# Patient Record
Sex: FEMALE | Race: WHITE | NOT HISPANIC OR LATINO | Employment: STUDENT | RURAL
[De-identification: names, ages, dates, MRNs, and addresses within clinical notes are randomized per-mention and may not be internally consistent; named-entity substitution may affect disease eponyms.]

---

## 2023-12-15 ENCOUNTER — OFFICE VISIT (OUTPATIENT)
Dept: FAMILY MEDICINE CLINIC | Facility: CLINIC | Age: 18
End: 2023-12-15
Payer: COMMERCIAL

## 2023-12-15 VITALS
WEIGHT: 241 LBS | HEART RATE: 86 BPM | DIASTOLIC BLOOD PRESSURE: 66 MMHG | BODY MASS INDEX: 45.5 KG/M2 | SYSTOLIC BLOOD PRESSURE: 112 MMHG | OXYGEN SATURATION: 95 % | HEIGHT: 61 IN

## 2023-12-15 DIAGNOSIS — R61 EXCESSIVE SWEATING: ICD-10-CM

## 2023-12-15 DIAGNOSIS — F32.A DEPRESSION, UNSPECIFIED DEPRESSION TYPE: ICD-10-CM

## 2023-12-15 DIAGNOSIS — E28.2 PCOS (POLYCYSTIC OVARIAN SYNDROME): Primary | ICD-10-CM

## 2023-12-15 PROCEDURE — 99203 OFFICE O/P NEW LOW 30 MIN: CPT | Performed by: STUDENT IN AN ORGANIZED HEALTH CARE EDUCATION/TRAINING PROGRAM

## 2023-12-15 RX ORDER — UBIDECARENONE 100 MG
1 CAPSULE ORAL DAILY
COMMUNITY
Start: 2023-12-08

## 2023-12-15 RX ORDER — METFORMIN HYDROCHLORIDE 500 MG/1
1 TABLET, EXTENDED RELEASE ORAL DAILY
COMMUNITY
Start: 2023-09-29

## 2023-12-15 RX ORDER — DESVENLAFAXINE SUCCINATE 50 MG/1
TABLET, EXTENDED RELEASE ORAL
COMMUNITY
Start: 2023-12-11

## 2023-12-15 RX ORDER — HYDROXYZINE HYDROCHLORIDE 25 MG/1
TABLET, FILM COATED ORAL
COMMUNITY
Start: 2023-11-24

## 2023-12-15 RX ORDER — DROSPIRENONE AND ETHINYL ESTRADIOL 0.03MG-3MG
1 KIT ORAL DAILY
COMMUNITY
Start: 2023-11-10

## 2023-12-15 NOTE — PROGRESS NOTES
"Chief Complaint  Establish Care (Pt was seeing Susan Mcgrath at [facility]. Pt is having a problem with excessive sweating.)    Subjective          Stephy Ortiz presents to [facility]  History of Present Illness    Patient is here to establish care.     She states that she has been having trouble with sweating. She has this all over especially in areas that rub, her hands and legs. This has been going on for about a year.  She states that she has had blood work done, but was told that while everything looked okay she was not able to have all the labs done.  She does testosterone was not done but is not sure if any others were not as well.    She states that she has been on the metformin and birth control for about a year. She is taking this for PCOS and prediabetes.  She is tolerating his medications without any side effects.    She has been on the antidepressant for about that amount of time as well.  He feels that her mood is well-controlled at this time.        Objective   Vital Signs:   /66   Pulse 86   Ht 154.9 cm (61\")   Wt 109 kg (241 lb)   SpO2 95%   BMI 45.54 kg/m²     Body mass index is 45.54 kg/m².    Review of Systems    Past History:  Medical History: has a past medical history of Depression, Insomnia, Menorrhagia, PCOS (polycystic ovarian syndrome), and Prediabetes.   Surgical History: has no past surgical history on file.   Family History: family history is not on file.   Social History: reports that she has never smoked. She has never used smokeless tobacco. Drug use questions deferred to the physician. She reports that she does not drink alcohol.      Current Outpatient Medications:     D3-1000 25 MCG (1000 UT) capsule, Take 1 capsule by mouth Daily., Disp: , Rfl:     desvenlafaxine (PRISTIQ) 50 MG 24 hr tablet, , Disp: , Rfl:     drospirenone-ethinyl estradiol (VIOLETTA,OCELLA) 3-0.03 MG per tablet, Take 1 tablet by mouth Daily., Disp: , Rfl: "     hydrOXYzine (ATARAX) 25 MG tablet, TAKE 1 TABLET BY MOUTH EVERY NIGHT AS NEEDED FOR INSOMNIA, Disp: , Rfl:     metFORMIN ER (GLUCOPHAGE-XR) 500 MG 24 hr tablet, Take 1 tablet by mouth Daily., Disp: , Rfl:     Allergies: Eggs or egg-derived products and Fish-derived products    Physical Exam  Constitutional:       General: She is not in acute distress.     Appearance: She is not ill-appearing or toxic-appearing.   HENT:      Head: Normocephalic and atraumatic.   Cardiovascular:      Rate and Rhythm: Normal rate and regular rhythm.      Heart sounds: No murmur heard.  Pulmonary:      Effort: Pulmonary effort is normal. No respiratory distress.   Neurological:      General: No focal deficit present.      Mental Status: She is alert and oriented to person, place, and time.   Psychiatric:         Mood and Affect: Mood normal.         Thought Content: Thought content normal.          Result Review :                   Assessment and Plan    Diagnoses and all orders for this visit:    1. PCOS (polycystic ovarian syndrome) (Primary)    2. Depression, unspecified depression type    3. Excessive sweating    Patient to sign records releases to see what blood work was done previously and we will discuss repeating labs as well as any other labs that we need to check including TSH, testosterone, DHEA, and inflammatory markers.  Patient is agreeable to this.    Will continue current medications at current doses until we have records to review.  Follow-up up in 6 to 8 weeks or sooner with any new or worsening symptoms.        Follow Up   No follow-ups on file.  Patient was given instructions and counseling regarding her condition or for health maintenance advice. Please see specific information pulled into the AVS if appropriate.     Gely Mendieta, DO

## 2024-01-04 ENCOUNTER — LAB (OUTPATIENT)
Dept: FAMILY MEDICINE CLINIC | Facility: CLINIC | Age: 19
End: 2024-01-04
Payer: COMMERCIAL

## 2024-01-04 DIAGNOSIS — F32.A DEPRESSION, UNSPECIFIED DEPRESSION TYPE: ICD-10-CM

## 2024-01-04 DIAGNOSIS — E28.2 PCOS (POLYCYSTIC OVARIAN SYNDROME): Primary | ICD-10-CM

## 2024-01-04 DIAGNOSIS — R61 EXCESSIVE SWEATING: ICD-10-CM

## 2024-01-04 DIAGNOSIS — Z13.220 LIPID SCREENING: ICD-10-CM

## 2024-01-04 DIAGNOSIS — R73.01 ELEVATED FASTING GLUCOSE: ICD-10-CM

## 2024-01-08 LAB
ALBUMIN SERPL-MCNC: 4.4 G/DL (ref 4–5)
ALBUMIN/GLOB SERPL: 1.4 {RATIO} (ref 1.2–2.2)
ALP SERPL-CCNC: 78 IU/L (ref 42–106)
ALT SERPL-CCNC: 5 IU/L (ref 0–32)
AST SERPL-CCNC: 12 IU/L (ref 0–40)
BASOPHILS # BLD AUTO: 0.1 X10E3/UL (ref 0–0.2)
BASOPHILS NFR BLD AUTO: 1 %
BILIRUB SERPL-MCNC: 0.2 MG/DL (ref 0–1.2)
BUN SERPL-MCNC: 10 MG/DL (ref 6–20)
BUN/CREAT SERPL: 13 (ref 9–23)
CALCIUM SERPL-MCNC: 10.4 MG/DL (ref 8.7–10.2)
CHLORIDE SERPL-SCNC: 104 MMOL/L (ref 96–106)
CHOLEST SERPL-MCNC: 166 MG/DL (ref 100–199)
CO2 SERPL-SCNC: 20 MMOL/L (ref 20–29)
CREAT SERPL-MCNC: 0.76 MG/DL (ref 0.57–1)
DHEA SERPL-MCNC: 87 NG/DL (ref 31–701)
EGFRCR SERPLBLD CKD-EPI 2021: 115 ML/MIN/1.73
EOSINOPHIL # BLD AUTO: 0.4 X10E3/UL (ref 0–0.4)
EOSINOPHIL NFR BLD AUTO: 4 %
ERYTHROCYTE [DISTWIDTH] IN BLOOD BY AUTOMATED COUNT: 13.8 % (ref 11.7–15.4)
ESTROGEN SERPL-MCNC: 134 PG/ML
FSH SERPL-ACNC: 2.9 MIU/ML
GLOBULIN SER CALC-MCNC: 3.2 G/DL (ref 1.5–4.5)
GLUCOSE SERPL-MCNC: 101 MG/DL (ref 70–99)
HBA1C MFR BLD: 5.6 % (ref 4.8–5.6)
HCT VFR BLD AUTO: 42 % (ref 34–46.6)
HDLC SERPL-MCNC: 61 MG/DL
HGB BLD-MCNC: 13.9 G/DL (ref 11.1–15.9)
IMM GRANULOCYTES # BLD AUTO: 0 X10E3/UL (ref 0–0.1)
IMM GRANULOCYTES NFR BLD AUTO: 0 %
LDLC SERPL CALC-MCNC: 80 MG/DL (ref 0–99)
LH SERPL-ACNC: 4.6 MIU/ML
LYMPHOCYTES # BLD AUTO: 3.1 X10E3/UL (ref 0.7–3.1)
LYMPHOCYTES NFR BLD AUTO: 34 %
MCH RBC QN AUTO: 26.3 PG (ref 26.6–33)
MCHC RBC AUTO-ENTMCNC: 33.1 G/DL (ref 31.5–35.7)
MCV RBC AUTO: 80 FL (ref 79–97)
MONOCYTES # BLD AUTO: 0.3 X10E3/UL (ref 0.1–0.9)
MONOCYTES NFR BLD AUTO: 4 %
NEUTROPHILS # BLD AUTO: 5.1 X10E3/UL (ref 1.4–7)
NEUTROPHILS NFR BLD AUTO: 57 %
PLATELET # BLD AUTO: 478 X10E3/UL (ref 150–450)
POTASSIUM SERPL-SCNC: 5 MMOL/L (ref 3.5–5.2)
PROT SERPL-MCNC: 7.6 G/DL (ref 6–8.5)
RBC # BLD AUTO: 5.28 X10E6/UL (ref 3.77–5.28)
SODIUM SERPL-SCNC: 141 MMOL/L (ref 134–144)
T4 FREE SERPL-MCNC: 0.99 NG/DL (ref 0.82–1.77)
TESTOST SERPL-MCNC: 14 NG/DL (ref 13–71)
TRIGL SERPL-MCNC: 146 MG/DL (ref 0–149)
TSH SERPL DL<=0.005 MIU/L-ACNC: 2.77 UIU/ML (ref 0.45–4.5)
VLDLC SERPL CALC-MCNC: 25 MG/DL (ref 5–40)
WBC # BLD AUTO: 9.1 X10E3/UL (ref 3.4–10.8)

## 2024-01-19 ENCOUNTER — TELEMEDICINE (OUTPATIENT)
Dept: FAMILY MEDICINE CLINIC | Facility: CLINIC | Age: 19
End: 2024-01-19
Payer: COMMERCIAL

## 2024-01-19 DIAGNOSIS — R61 EXCESSIVE SWEATING: Primary | ICD-10-CM

## 2024-01-19 PROCEDURE — 99213 OFFICE O/P EST LOW 20 MIN: CPT | Performed by: STUDENT IN AN ORGANIZED HEALTH CARE EDUCATION/TRAINING PROGRAM

## 2024-01-19 NOTE — PROGRESS NOTES
"Chief Complaint  No chief complaint on file.    Subjective          Stephy Ortiz presents to [facility]  History of Present Illness    [facility] and discussion of sweating.     She states that she is doing ok at this time. She states that she has been doing wel with her medications.     She states that she has been continuing to have trouble with sweatting, worse on her legs. She states that she is concerned about it being \"something internal\" or because of her medications, but she is very hesitant to change her pristiq as she has had genesight testing and has been told that this was one of the only ones that she can take.             Objective   Vital Signs:   There were no vitals taken for this visit.    There is no height or weight on file to calculate BMI.    Review of Systems    Past History:  Medical History: has a past medical history of Depression, Insomnia, Menorrhagia, PCOS (polycystic ovarian syndrome), and Prediabetes.   Surgical History: has no past surgical history on file.   Family History: family history is not on file.   Social History: reports that she has never smoked. She has never used smokeless tobacco. Drug use questions deferred to the physician. She reports that she does not drink alcohol.      Current Outpatient Medications:     aluminum chloride (DRYSOL) 20 % external solution, Apply  topically to the appropriate area as directed Every Night., Disp: 60 mL, Rfl: 1    D3-1000 25 MCG (1000 UT) capsule, Take 1 capsule by mouth Daily., Disp: , Rfl:     desvenlafaxine (PRISTIQ) 50 MG 24 hr tablet, , Disp: , Rfl:     drospirenone-ethinyl estradiol (VIOLTETA,OCELLA) 3-0.03 MG per tablet, Take 1 tablet by mouth Daily., Disp: , Rfl:     hydrOXYzine (ATARAX) 25 MG tablet, TAKE 1 TABLET BY MOUTH EVERY NIGHT AS NEEDED FOR INSOMNIA, Disp: , Rfl:     metFORMIN ER (GLUCOPHAGE-XR) 500 MG 24 hr tablet, Take 1 tablet by mouth Daily., " Disp: , Rfl:     Allergies: Eggs or egg-derived products and Fish-derived products    Physical Exam  Constitutional:       Comments: Limited 2/2 Virtual visit.    Pulmonary:      Comments: Speaking in complete sentences. No audible wheezing  Neurological:      Mental Status: She is alert and oriented to person, place, and time.   Psychiatric:         Mood and Affect: Mood normal.         Thought Content: Thought content normal.          Result Review :                   Assessment and Plan    Diagnoses and all orders for this visit:    1. Excessive sweating (Primary)    Other orders  -     aluminum chloride (DRYSOL) 20 % external solution; Apply  topically to the appropriate area as directed Every Night.  Dispense: 60 mL; Refill: 1    Overall blood work looks ok. No significant hormonal abnormalities noted. Will do trial of Drysol to see if this helps with her sweating, but if not will discuss changing mood medications or sending to dermatology for further evaluation. She is agreeable to this.     Discussed limitations to virtual visit and patient was agreeable to continue. Discussed that because of the limitations of minimal physical exam that if there is interval worsening they should present to the office for either curbside visit, or to the emergency department for further evaluation and definitive management. Patient was agreeable to this.    Mode of Visit: Video  Location of patient: home  [place] of provider[place] [facility]  You have chosen to receive care through a telehealth visit.  Does the patient consent to use a video/audio connection for your medical care today? Yes  The visit included audio and video interaction. No technical issues occurred during this visit.     I spent 24 minutes caring for Stephy on this date of service. This time includes time spent by me in the following activities:preparing for the visit, obtaining and/or reviewing a separately obtained history, counseling and educating the  patient/family/caregiver, ordering medications, tests, or procedures, and documenting information in the medical record  Follow Up   No follow-ups on file.  Patient was given instructions and counseling regarding her condition or for health maintenance advice. Please see specific information pulled into the AVS if appropriate.     Gely Mendieta, DO

## 2024-03-01 ENCOUNTER — OFFICE VISIT (OUTPATIENT)
Dept: FAMILY MEDICINE CLINIC | Facility: CLINIC | Age: 19
End: 2024-03-01
Payer: COMMERCIAL

## 2024-03-01 VITALS
SYSTOLIC BLOOD PRESSURE: 110 MMHG | WEIGHT: 250 LBS | BODY MASS INDEX: 47.2 KG/M2 | OXYGEN SATURATION: 98 % | HEIGHT: 61 IN | DIASTOLIC BLOOD PRESSURE: 68 MMHG | HEART RATE: 100 BPM

## 2024-03-01 DIAGNOSIS — R61 HYPERHIDROSIS: Primary | ICD-10-CM

## 2024-03-01 DIAGNOSIS — J45.20 MILD INTERMITTENT ASTHMA WITHOUT COMPLICATION: ICD-10-CM

## 2024-03-01 PROCEDURE — 99213 OFFICE O/P EST LOW 20 MIN: CPT | Performed by: STUDENT IN AN ORGANIZED HEALTH CARE EDUCATION/TRAINING PROGRAM

## 2024-03-01 RX ORDER — ALBUTEROL SULFATE 90 UG/1
2 AEROSOL, METERED RESPIRATORY (INHALATION) EVERY 4 HOURS PRN
Qty: 18 G | Refills: 1 | Status: SHIPPED | OUTPATIENT
Start: 2024-03-01

## 2024-03-01 RX ORDER — MONTELUKAST SODIUM 10 MG/1
10 TABLET ORAL NIGHTLY
Qty: 90 TABLET | Refills: 1 | Status: SHIPPED | OUTPATIENT
Start: 2024-03-01

## 2024-03-01 NOTE — PROGRESS NOTES
"Chief Complaint  Follow-up    Subjective          Stephy Ortiz presents to [facility]  History of Present Illness    Patient presents the office today for follow-up on hyperhidrosis.  She states that the Drysol has not made any difference in her hyperhidrosis symptoms.  Patient would like to see dermatology at this time if possible.    She also states that she has been having more difficulty with her allergies and asthma here recently.  She states that she is getting short of breath whenever she was walking across campus especially when the weather has been changing.    Objective   Vital Signs:   /68   Pulse 100   Ht 154.9 cm (61\")   Wt 113 kg (250 lb)   SpO2 98%   BMI 47.24 kg/m²     Body mass index is 47.24 kg/m².    Review of Systems    Past History:  Medical History: has a past medical history of Depression, Insomnia, Menorrhagia, PCOS (polycystic ovarian syndrome), and Prediabetes.   Surgical History: has no past surgical history on file.   Family History: family history is not on file.   Social History: reports that she has never smoked. She has never used smokeless tobacco. Drug use questions deferred to the physician. She reports that she does not drink alcohol.      Current Outpatient Medications:     albuterol sulfate  (90 Base) MCG/ACT inhaler, Inhale 2 puffs Every 4 (Four) Hours As Needed for Wheezing., Disp: 18 g, Rfl: 1    D3-1000 25 MCG (1000 UT) capsule, Take 1 capsule by mouth Daily., Disp: , Rfl:     desvenlafaxine (PRISTIQ) 50 MG 24 hr tablet, , Disp: , Rfl:     drospirenone-ethinyl estradiol (VIOLETTA,OCELLA) 3-0.03 MG per tablet, Take 1 tablet by mouth Daily., Disp: , Rfl:     hydrOXYzine (ATARAX) 25 MG tablet, TAKE 1 TABLET BY MOUTH EVERY NIGHT AS NEEDED FOR INSOMNIA, Disp: , Rfl:     metFORMIN ER (GLUCOPHAGE-XR) 500 MG 24 hr tablet, Take 1 tablet by mouth Daily., Disp: , Rfl:     montelukast (Singulair) 10 MG tablet, Take 1 tablet by mouth Every " Night., Disp: 90 tablet, Rfl: 1    Allergies: Eggs or egg-derived products and Fish-derived products    Physical Exam  Constitutional:       General: She is not in acute distress.     Appearance: She is not ill-appearing or toxic-appearing.   HENT:      Head: Normocephalic and atraumatic.   Cardiovascular:      Rate and Rhythm: Normal rate and regular rhythm.      Heart sounds: No murmur heard.  Pulmonary:      Effort: Pulmonary effort is normal. No respiratory distress.   Neurological:      General: No focal deficit present.      Mental Status: She is alert and oriented to person, place, and time.   Psychiatric:         Mood and Affect: Mood normal.         Thought Content: Thought content normal.          Result Review :                   Assessment and Plan    Diagnoses and all orders for this visit:    1. Hyperhidrosis (Primary)  -     Ambulatory Referral to Dermatology    2. Mild intermittent asthma without complication    Other orders  -     albuterol sulfate  (90 Base) MCG/ACT inhaler; Inhale 2 puffs Every 4 (Four) Hours As Needed for Wheezing.  Dispense: 18 g; Refill: 1  -     montelukast (Singulair) 10 MG tablet; Take 1 tablet by mouth Every Night.  Dispense: 90 tablet; Refill: 1    Referral to dermatology for hyperhydrosis.     Will do Montelukast for allergies as well as adding albuterol inhaler.         Follow Up   No follow-ups on file.  Patient was given instructions and counseling regarding her condition or for health maintenance advice. Please see specific information pulled into the AVS if appropriate.     Gely Mendieta, DO

## 2024-03-07 RX ORDER — HYDROXYZINE HYDROCHLORIDE 25 MG/1
25 TABLET, FILM COATED ORAL
Qty: 90 TABLET | Refills: 1 | Status: SHIPPED | OUTPATIENT
Start: 2024-03-07

## 2024-03-07 RX ORDER — DESVENLAFAXINE SUCCINATE 50 MG/1
50 TABLET, EXTENDED RELEASE ORAL DAILY
Qty: 90 TABLET | Refills: 1 | Status: SHIPPED | OUTPATIENT
Start: 2024-03-07

## 2024-03-07 NOTE — TELEPHONE ENCOUNTER
Caller: Stephy Ortiz    Relationship: Self    Best call back number:  [phone]     Requested Prescriptions:   Requested Prescriptions     Pending Prescriptions Disp Refills    hydrOXYzine (ATARAX) 25 MG tablet      desvenlafaxine (PRISTIQ) 50 MG 24 hr tablet          Pharmacy where request should be sent: [facility] #[id number] - [place], [place] - [address]  [address] [facility] [phone] [facility] [phone] FX     Last office visit with prescribing clinician: 3/1/2024   Last telemedicine visit with prescribing clinician: 1/19/2024   Next office visit with prescribing clinician: Visit date not found       Does the patient have less than a 3 day supply:  [x] Yes  [] No    Would you like a call back once the refill request has been completed: [] Yes [x] No    If the office needs to give you a call back, can they leave a voicemail: [] Yes [] No    Ruel Pickens Rep   03/07/24 14:13 EST

## 2024-03-07 NOTE — TELEPHONE ENCOUNTER
Rx Refill Note  Requested Prescriptions     Pending Prescriptions Disp Refills    hydrOXYzine (ATARAX) 25 MG tablet      desvenlafaxine (PRISTIQ) 50 MG 24 hr tablet        Last office visit with prescribing clinician: 3/1/2024   Last telemedicine visit with prescribing clinician: 1/19/2024   Next office visit with prescribing clinician: Visit date not found                         Would you like a call back once the refill request has been completed: [] Yes [] No    If the office needs to give you a call back, can they leave a voicemail: [] Yes [] No    Lachelle El MA  03/07/24, 15:56 EST

## 2024-03-12 NOTE — TELEPHONE ENCOUNTER
Caller: Stephy Ortiz    Relationship: Self    Best call back number: [phone]     Requested Prescriptions:   Requested Prescriptions     Pending Prescriptions Disp Refills    albuterol sulfate  (90 Base) MCG/ACT inhaler 18 g 1     Sig: Inhale 2 puffs Every 4 (Four) Hours As Needed for Wheezing.        Pharmacy where request should be sent: [facility] #[id number] - [place] [address]  [address] [facility] [phone] [facility] [phone] FX     Last office visit with prescribing clinician: 3/1/2024   Last telemedicine visit with prescribing clinician: 1/19/2024   Next office visit with prescribing clinician: Visit date not found     Additional details provided by patient:   PATIENT STATED THAT SHE WAS INFORMED BY THE PHARMACY THAT THEY DID NOT RECEIVED THE PRESCRIPTION SENT IN ON 03/01/2024 AND PATIENT WOULD LIKE FOR THE MEDICATION TO BE SENT INTO THE PHARMACY AGAIN TO BE ABLE TO GET MEDICATION       Does the patient have less than a 3 day supply:  [x] Yes  [] No    Would you like a call back once the refill request has been completed: [] Yes [x] No    If the office needs to give you a call back, can they leave a voicemail: [] Yes [x] No    Ruel Trent Rep   03/12/24 14:56 EDT

## 2024-03-13 RX ORDER — ALBUTEROL SULFATE 90 UG/1
2 AEROSOL, METERED RESPIRATORY (INHALATION) EVERY 4 HOURS PRN
Qty: 18 G | Refills: 1 | Status: SHIPPED | OUTPATIENT
Start: 2024-03-13

## 2024-03-13 NOTE — TELEPHONE ENCOUNTER
Contacted pharmacy, Rx on file, will fill for pt. LVM for pt to let her know the Rx is being filled and will be ready for  this afternoon.

## 2024-06-03 RX ORDER — MONTELUKAST SODIUM 10 MG/1
10 TABLET ORAL NIGHTLY
Qty: 90 TABLET | Refills: 1 | Status: SHIPPED | OUTPATIENT
Start: 2024-06-03

## 2024-06-03 RX ORDER — DROSPIRENONE AND ETHINYL ESTRADIOL 0.03MG-3MG
1 KIT ORAL DAILY
Qty: 84 TABLET | Refills: 3 | Status: SHIPPED | OUTPATIENT
Start: 2024-06-03

## 2024-06-03 NOTE — TELEPHONE ENCOUNTER
Rx Refill Note  Requested Prescriptions     Pending Prescriptions Disp Refills    drospirenone-ethinyl estradiol (VIOLETTA,OCELLA) 3-0.03 MG per tablet       Sig: Take 1 tablet by mouth Daily.      Last office visit with prescribing clinician: 3/1/2024   Last telemedicine visit with prescribing clinician: 1/19/2024   Next office visit with prescribing clinician: Visit date not found                         Would you like a call back once the refill request has been completed: [] Yes [] No    If the office needs to give you a call back, can they leave a voicemail: [] Yes [] No    Lachelle El MA  06/03/24, 14:00 EDT    [employer]

## 2024-06-03 NOTE — TELEPHONE ENCOUNTER
Caller: Stephy Ortiz    Relationship: Self    Best call back number: [phone]    Requested Prescriptions:   Requested Prescriptions     Pending Prescriptions Disp Refills    drospirenone-ethinyl estradiol (VIOLETTA,OCELLA) 3-0.03 MG per tablet       Sig: Take 1 tablet by mouth Daily.        Pharmacy where request should be sent: [facility][id number] - [place], [place] - [address] [place] [address] [facility] [address] [facility] [phone] [facility] [phone]      Last office visit with prescribing clinician: 3/1/2024   Last telemedicine visit with prescribing clinician: 1/19/2024   Next office visit with prescribing clinician: Visit date not found     Additional details provided by patient:       Ruel Baez Rep   06/03/24 13:24 EDT

## 2024-11-27 RX ORDER — DESVENLAFAXINE 50 MG/1
50 TABLET, FILM COATED, EXTENDED RELEASE ORAL DAILY
Qty: 90 TABLET | Refills: 1 | Status: SHIPPED | OUTPATIENT
Start: 2024-11-27

## 2024-11-27 NOTE — TELEPHONE ENCOUNTER
Spoke with patient to schedule and let her know it would be the new year before she had availability. She said she would call after checking her work schedule

## 2025-06-23 RX ORDER — ALBUTEROL SULFATE 90 UG/1
2 INHALANT RESPIRATORY (INHALATION) EVERY 4 HOURS PRN
Qty: 8.5 G | OUTPATIENT
Start: 2025-06-23